# Patient Record
Sex: MALE | Race: WHITE | HISPANIC OR LATINO | Employment: FULL TIME | URBAN - METROPOLITAN AREA
[De-identification: names, ages, dates, MRNs, and addresses within clinical notes are randomized per-mention and may not be internally consistent; named-entity substitution may affect disease eponyms.]

---

## 2019-07-08 ENCOUNTER — APPOINTMENT (OUTPATIENT)
Dept: URGENT CARE | Facility: CLINIC | Age: 34
End: 2019-07-08
Payer: COMMERCIAL

## 2019-07-08 ENCOUNTER — OCCUPATIONAL MEDICINE (OUTPATIENT)
Dept: URGENT CARE | Facility: CLINIC | Age: 34
End: 2019-07-08
Payer: COMMERCIAL

## 2019-07-08 VITALS
WEIGHT: 233 LBS | BODY MASS INDEX: 39.78 KG/M2 | RESPIRATION RATE: 20 BRPM | OXYGEN SATURATION: 97 % | DIASTOLIC BLOOD PRESSURE: 84 MMHG | TEMPERATURE: 97.8 F | SYSTOLIC BLOOD PRESSURE: 128 MMHG | HEART RATE: 83 BPM | HEIGHT: 64 IN

## 2019-07-08 DIAGNOSIS — S61.209A AVULSION OF FINGER TIP, INITIAL ENCOUNTER: ICD-10-CM

## 2019-07-08 DIAGNOSIS — Z02.1 PRE-EMPLOYMENT DRUG SCREENING: ICD-10-CM

## 2019-07-08 LAB
AMP AMPHETAMINE: NORMAL
BREATH ALCOHOL COMMENT: NORMAL
COC COCAINE: NORMAL
INT CON NEG: NORMAL
INT CON POS: NORMAL
MET METHAMPHETAMINES: NORMAL
OPI OPIATES: NORMAL
PCP PHENCYCLIDINE: NORMAL
POC BREATHALIZER: 0 PERCENT (ref 0–0.01)
POC DRUG COMMENT 753798-OCCUPATIONAL HEALTH: NEGATIVE
THC: NORMAL

## 2019-07-08 PROCEDURE — 99203 OFFICE O/P NEW LOW 30 MIN: CPT | Mod: 25,29 | Performed by: PHYSICIAN ASSISTANT

## 2019-07-08 PROCEDURE — 90471 IMMUNIZATION ADMIN: CPT | Mod: 29 | Performed by: PHYSICIAN ASSISTANT

## 2019-07-08 PROCEDURE — 80305 DRUG TEST PRSMV DIR OPT OBS: CPT | Mod: 29 | Performed by: PHYSICIAN ASSISTANT

## 2019-07-08 PROCEDURE — 90715 TDAP VACCINE 7 YRS/> IM: CPT | Mod: 29 | Performed by: PHYSICIAN ASSISTANT

## 2019-07-08 PROCEDURE — 82075 ASSAY OF BREATH ETHANOL: CPT | Mod: 29 | Performed by: PHYSICIAN ASSISTANT

## 2019-07-08 ASSESSMENT — ENCOUNTER SYMPTOMS
BRUISES/BLEEDS EASILY: 0
CONSTITUTIONAL NEGATIVE: 1
ROS SKIN COMMENTS: SEE HPI
NEUROLOGICAL NEGATIVE: 1

## 2019-07-08 NOTE — LETTER
[facility] [address] [place] [place] [address]  Phone:  [phone] - Fax:  [phone]   [facility] Progress Report and Disability Certification  Date of Service: 7/8/2019   No Show:  No  Date / Time of Next Visit: 7/15/2019 @3:45PM   Claim Information   Patient Name: Andrew Kaiser  Claim Number:     Employer: [employer]  Date of Injury: 7/8/2019     Insurer / TPA: Workers Choice  ID / SSN:     Occupation: [employer]  Diagnosis: Diagnoses of Avulsion of finger tip, initial encounter and Pre-employment drug screening were pertinent to this visit.    Medical Information   Related to Industrial Injury? Yes    Subjective Complaints:  DOI: 07/08/19, 1230. Patient was cutting salmon skin and accidentally cut the tip of his left middle finger.   He notes it immediately started bleeding and he cleaned it off. He wrapped it with what was available and came to urgent care.  Patient notes it was painful but no numbness or tingling.  He is right handed.   Normal ROM of digit.   Is not UTD on tetanus.    Objective Findings: Left hand, middle finger:   Left middle digit with distal lateral aspect avulsion including the distal aspect of the nail.  There is mild active bleeding.   Normal sensation.  S/p digital block with plain lidocaine wound would explored, SC involvement only noted    Brisk cap refill. Normal ROM of digit.   Sealed with Dermabond.    Pre-Existing Condition(s):     Assessment:   Initial Visit    Status: Additional Care Required  Permanent Disability:No    Plan:      Diagnostics:      Comments:       Disability Information   Status: Released to Restricted Duty    From:  7/8/2019  Through: 7/15/2019 Restrictions are:     Physical Restrictions   Sitting:    Standing:    Stooping:    Bending:      Squatting:    Walking:    Climbing:    Pushing:      Pulling:    Other:    Reaching Above Shoulder (L):   Reaching Above Shoulder (R):       Reaching Below Shoulder  (L):    Reaching Below Shoulder (R):      Not to exceed Weight Limits   Carrying(hrs):   Weight Limit(lb):   Lifting(hrs):   Weight  Limit(lb):     Comments: Patient is released to restricted duty with regards to left hand, must keep finger covered and be allowed to air out digit on breaks   Tdap updated.   Wound care discussed, dermabond care.     1 week follow up with anticipated MMI discussed, sooner for any concerns or signs of infection.     Repetitive Actions   Hands: i.e. Fine Manipulations from Grasping: < or = to 1 hr/day  Comments:Left hand   Feet: i.e. Operating Foot Controls:     Driving / Operate Machinery:     Physician Name: Jm Mustafa P.A.-C. Physician Signature: JM Ennis P.A.-C. e-Signature: Dr. Salvatore Rodriguez, Medical Director   Clinic Name / Location: [address] [address] Clinic Phone Number: Dept: [phone]   Appointment Time: 2:15 Pm Visit Start Time: 2:40 PM   Check-In Time:  2:08 Pm Visit Discharge Time: 9:38 Pm    Original-Treating Physician or Chiropractor    Page 2-Insurer/TPA    Page 3-Employer    Page 4-Employee

## 2019-07-08 NOTE — PROGRESS NOTES
"Subjective:      Andrew Kaiser is a 33 y.o. male who presents with Finger Injury (NEW  DOI-7/8/19 (L) middle finger)      DOI: 07/08/19, 1230. Patient was cutting salmon skin and accidentally cut the tip of his left middle finger.   He notes it immediately started bleeding and he cleaned it off. He wrapped it with what was available and came to urgent care.  Patient notes it was painful but no numbness or tingling.  He is right handed.   Normal ROM of digit.   Is not UTD on tetanus.      HPI  As above.     Review of Systems   Constitutional: Negative.    Musculoskeletal:        SEE HPI   Skin:        SEE HPI   Neurological: Negative.    Endo/Heme/Allergies: Does not bruise/bleed easily.       PMH:  has no past medical history on file.  MEDS: No current outpatient prescriptions on file.  ALLERGIES: No Known Allergies  SURGHX: No past surgical history on file.  SOCHX:    FH: Family history was reviewed, no pertinent findings to report   Objective:     /84 (BP Location: Left arm, Patient Position: Sitting)   Pulse 83   Temp 36.6 °C (97.8 °F) (Temporal)   Resp 20   Ht 1.626 m (5' 4\")   Wt 105.7 kg (233 lb)   SpO2 97%   BMI 39.99 kg/m²      Physical Exam   Constitutional: He is oriented to person, place, and time. He appears well-developed and well-nourished. No distress.   Cardiovascular: Normal rate.    Pulmonary/Chest: Effort normal.   Neurological: He is alert and oriented to person, place, and time.   Skin: Skin is warm and dry.   Psychiatric: He has a normal mood and affect. His behavior is normal.   Vitals reviewed.    Left hand, middle finger:   Left middle digit with distal lateral aspect avulsion including the distal aspect of the nail.  There is mild active bleeding.   Normal sensation.  S/p digital block with plain lidocaine wound would explored, SC involvement only noted    Brisk cap refill. Normal ROM of digit.   Sealed with Dermabond.        Assessment/Plan:     1. Avulsion of finger tip, " initial encounter  Tdap =>8yo IM       Patient is released to full duty but must keep finger covered and be allowed to air out digit on breaks   Tdap updated.   Wound care discussed, dermabond care.     1 week follow up with anticipated MMI discussed, sooner for any concerns or signs of infection.       Itzel Mustafa P.A.-C.

## 2019-07-08 NOTE — LETTER
[facility] [address] [place] [place] [address]  Phone:  [phone] - Fax:  [phone]   [facility] Progress Report and Disability Certification  Date of Service: 7/8/2019   No Show:  No  Date / Time of Next Visit: 7/15/2019 @ 3:40pm   Claim Information   Patient Name: Andrew Kaiser  Claim Number:     Employer: [employer]  Date of Injury: 7/8/2019     Insurer / TPA: Workers [employer]  ID / SSN:     Occupation: [employer]  Diagnosis: Diagnoses of Avulsion of finger tip, initial encounter and Pre-employment drug screening were pertinent to this visit.    Medical Information   Related to Industrial Injury? Yes    Subjective Complaints:  DOI: 07/08/19, 1230. Patient was cutting salmon skin and accidentally cut the tip of his left middle finger.   He notes it immediately started bleeding and he cleaned it off. He wrapped it with what was available and came to urgent care.  Patient notes it was painful but no numbness or tingling.  He is right handed.   Normal ROM of digit.   Is not UTD on tetanus.    Objective Findings: Left hand, middle finger:   Left middle digit with distal lateral aspect avulsion including the distal aspect of the nail.  There is mild active bleeding.   Normal sensation.  S/p digital block with plain lidocaine wound would explored, SC involvement only noted    Brisk cap refill. Normal ROM of digit.   Sealed with Dermabond.    Pre-Existing Condition(s):     Assessment:   Initial Visit    Status: Additional Care Required  Permanent Disability:No    Plan:      Diagnostics:      Comments:       Disability Information   Status: Released to Full Duty    From:  7/8/2019  Through: 7/15/2019 Restrictions are:     Physical Restrictions   Sitting:    Standing:    Stooping:    Bending:      Squatting:    Walking:    Climbing:    Pushing:      Pulling:    Other:    Reaching Above Shoulder (L):   Reaching Above Shoulder (R):       Reaching Below Shoulder (L):   Reaching Below Shoulder (R):      Not to exceed Weight Limits   Carrying(hrs):   Weight Limit(lb):   Lifting(hrs):   Weight  Limit(lb):     Comments: Patient is released to full duty but must keep finger covered and be allowed to air out digit on breaks   Tdap updated.   Wound care discussed, dermabond care.     1 week follow up with anticipated MMI discussed, sooner for any concerns or signs of infection.     Repetitive Actions   Hands: i.e. Fine Manipulations from Grasping:     Feet: i.e. Operating Foot Controls:     Driving / Operate Machinery:     Physician Name: Jm Mustafa P.A.-C. Physician Signature: JM Ennis P.A.-C. e-Signature: Dr. Salvatore Rodriguez, Medical Director   Clinic Name / Location: [address] [address] Clinic Phone Number: Dept: [phone]   Appointment Time: 2:15 Pm Visit Start Time: 2:40 PM   Check-In Time:  2:08 Pm Visit Discharge Time:  4:32 PM   Original-Treating Physician or Chiropractor    Page 2-Insurer/TPA    Page 3-Employer    Page 4-Employee

## 2019-07-09 ENCOUNTER — APPOINTMENT (OUTPATIENT)
Dept: URGENT CARE | Facility: CLINIC | Age: 34
End: 2019-07-09
Payer: COMMERCIAL

## 2019-07-15 ENCOUNTER — OCCUPATIONAL MEDICINE (OUTPATIENT)
Dept: URGENT CARE | Facility: CLINIC | Age: 34
End: 2019-07-15
Payer: COMMERCIAL

## 2019-07-15 VITALS
TEMPERATURE: 98 F | HEIGHT: 64 IN | DIASTOLIC BLOOD PRESSURE: 82 MMHG | WEIGHT: 234 LBS | RESPIRATION RATE: 16 BRPM | OXYGEN SATURATION: 98 % | HEART RATE: 79 BPM | BODY MASS INDEX: 39.95 KG/M2 | SYSTOLIC BLOOD PRESSURE: 124 MMHG

## 2019-07-15 DIAGNOSIS — S61.209D AVULSION OF FINGER TIP, SUBSEQUENT ENCOUNTER: ICD-10-CM

## 2019-07-15 PROCEDURE — 99213 OFFICE O/P EST LOW 20 MIN: CPT | Mod: 29 | Performed by: PHYSICIAN ASSISTANT

## 2019-07-15 ASSESSMENT — ENCOUNTER SYMPTOMS
CONSTITUTIONAL NEGATIVE: 1
NEUROLOGICAL NEGATIVE: 1

## 2019-07-15 NOTE — LETTER
"   [facility] [place]  [address] [place] [place] [address]  Phone:  [phone] - Fax:  [phone]   [facility] Progress Report and Disability Certification  Date of Service: 7/15/2019   No Show:  No  Date / Time of Next Visit:  MMI   Claim Information   Patient Name: Andrew Kaiser  Claim Number:     Employer: [employer]  Date of Injury: 7/8/2019     Insurer / TPA: Workers [employer]  ID / SSN:     Occupation: [employer]  Diagnosis: The encounter diagnosis was Avulsion of finger tip, subsequent encounter.    Medical Information   Related to Industrial Injury? Yes    Subjective Complaints:  DOI: 07/08/19, 1230. \"Patient was cutting salmon skin and accidentally cut the tip of his left middle finger.\"  Patient returns today to report it has been healing well.  Slight sensitivity but no further bleeding or irritation.   He feels he is ready to go back to work.     Objective Findings: Vitals reviewed.   Left hand, middle finger:   Left middle digit with distal lateral aspect WELL HEALING avulsion including the distal aspect of the nail.   Eschar formation, clean dry, no erythema or swelling.     Pre-Existing Condition(s):     Assessment:   Condition Improved    Status: Discharged /  MMI  Permanent Disability:No    Plan:      Diagnostics:      Comments:       Disability Information   Status: Released to Full Duty    From:     Through:   Restrictions are:     Physical Restrictions   Sitting:    Standing:    Stooping:    Bending:      Squatting:    Walking:    Climbing:    Pushing:      Pulling:    Other:    Reaching Above Shoulder (L):   Reaching Above Shoulder (R):       Reaching Below Shoulder (L):    Reaching Below Shoulder (R):      Not to exceed Weight Limits   Carrying(hrs):   Weight Limit(lb):   Lifting(hrs):   Weight  Limit(lb):     Comments: Well healing avulsion laceration.   Wound sealed, dry and without signs of infection   He is cleared to return to full duty on his next " shift starting tomorrow.  He will continue wear gloves as required at work.     Repetitive Actions   Hands: i.e. Fine Manipulations from Grasping:     Feet: i.e. Operating Foot Controls:     Driving / Operate Machinery:     Physician Name: Jm Mustafa P.A.-C. Physician Signature: JM Ennis P.A.-C. e-Signature: Dr. Salvatore Rodriguez, Medical Director   Clinic Name / Location: [address] [address] Clinic Phone Number: Dept: [phone]   Appointment Time: 3:45 Pm Visit Start Time: 3:43 PM   Check-In Time:  3:34 Pm Visit Discharge Time:  3:51 PM   Original-Treating Physician or Chiropractor    Page 2-Insurer/TPA    Page 3-Employer    Page 4-Employee

## 2019-07-15 NOTE — PROGRESS NOTES
"Subjective:      Andrew Kaiser is a 33 y.o. male who presents with Finger Injury (workers comp finger injury )      DOI: 07/08/19, 1230. \"Patient was cutting salmon skin and accidentally cut the tip of his left middle finger.\"  Patient returns today to report it has been healing well.  Slight sensitivity but no further bleeding or irritation.   He feels he is ready to go back to work.       HPI   As above.     Review of Systems   Constitutional: Negative.    Musculoskeletal:        SEE HPI   Neurological: Negative.    Endo/Heme/Allergies: Negative.        PMH:  has no past medical history on file.  MEDS: No current outpatient prescriptions on file.  ALLERGIES: No Known Allergies  SURGHX: No past surgical history on file.  SOCHX:  does not have a smoking history on file. He has never used smokeless tobacco.  FH: Family history was reviewed, no pertinent findings to report   Objective:     /82 (BP Location: Left arm, Patient Position: Sitting, BP Cuff Size: Adult)   Pulse 79   Temp 36.7 °C (98 °F) (Temporal)   Resp 16   Ht 1.626 m (5' 4\")   Wt 106.1 kg (234 lb)   SpO2 98%   BMI 40.17 kg/m²      Physical Exam   Constitutional: He is oriented to person, place, and time. He appears well-developed and well-nourished. No distress.   Cardiovascular: Normal rate.    Pulmonary/Chest: Effort normal.   Neurological: He is alert and oriented to person, place, and time.   Skin: Skin is warm and dry.   Psychiatric: He has a normal mood and affect. His behavior is normal.     Vitals reviewed.   Left hand, middle finger:   Left middle digit with distal lateral aspect WELL HEALING avulsion including the distal aspect of the nail.   Eschar formation, clean dry, no erythema or swelling.         Assessment/Plan:     1. Avulsion of finger tip, subsequent encounter         -well healing laceration   -MMI      Itzel Mustafa P.A.-C.      "

## 2019-11-24 ENCOUNTER — APPOINTMENT (OUTPATIENT)
Dept: RADIOLOGY | Facility: MEDICAL CENTER | Age: 34
End: 2019-11-24
Attending: EMERGENCY MEDICINE
Payer: COMMERCIAL

## 2019-11-24 ENCOUNTER — HOSPITAL ENCOUNTER (EMERGENCY)
Facility: MEDICAL CENTER | Age: 34
End: 2019-11-24
Attending: EMERGENCY MEDICINE
Payer: COMMERCIAL

## 2019-11-24 VITALS
HEART RATE: 91 BPM | OXYGEN SATURATION: 94 % | TEMPERATURE: 97.9 F | WEIGHT: 225.97 LBS | DIASTOLIC BLOOD PRESSURE: 68 MMHG | HEIGHT: 65 IN | SYSTOLIC BLOOD PRESSURE: 130 MMHG | RESPIRATION RATE: 18 BRPM | BODY MASS INDEX: 37.65 KG/M2

## 2019-11-24 DIAGNOSIS — B34.9 VIRAL SYNDROME: ICD-10-CM

## 2019-11-24 LAB
FLUAV RNA SPEC QL NAA+PROBE: NEGATIVE
FLUBV RNA SPEC QL NAA+PROBE: POSITIVE

## 2019-11-24 PROCEDURE — 87502 INFLUENZA DNA AMP PROBE: CPT

## 2019-11-24 PROCEDURE — 71046 X-RAY EXAM CHEST 2 VIEWS: CPT

## 2019-11-24 PROCEDURE — 700102 HCHG RX REV CODE 250 W/ 637 OVERRIDE(OP): Performed by: EMERGENCY MEDICINE

## 2019-11-24 PROCEDURE — A9270 NON-COVERED ITEM OR SERVICE: HCPCS | Performed by: EMERGENCY MEDICINE

## 2019-11-24 PROCEDURE — 99284 EMERGENCY DEPT VISIT MOD MDM: CPT

## 2019-11-24 RX ORDER — BENZONATATE 100 MG/1
100 CAPSULE ORAL 3 TIMES DAILY PRN
Qty: 60 CAP | Refills: 0 | Status: SHIPPED | OUTPATIENT
Start: 2019-11-24

## 2019-11-24 RX ORDER — BENZONATATE 100 MG/1
100 CAPSULE ORAL ONCE
Status: COMPLETED | OUTPATIENT
Start: 2019-11-24 | End: 2019-11-24

## 2019-11-24 RX ORDER — ACETAMINOPHEN 325 MG/1
650 TABLET ORAL ONCE
Status: COMPLETED | OUTPATIENT
Start: 2019-11-24 | End: 2019-11-24

## 2019-11-24 RX ADMIN — BENZONATATE 100 MG: 100 CAPSULE ORAL at 03:25

## 2019-11-24 RX ADMIN — ACETAMINOPHEN 650 MG: 325 TABLET, FILM COATED ORAL at 03:25

## 2019-11-24 SDOH — HEALTH STABILITY: MENTAL HEALTH: HOW OFTEN DO YOU HAVE A DRINK CONTAINING ALCOHOL?: NEVER

## 2019-11-24 NOTE — ED PROVIDER NOTES
ED Provider Note    Scribed for Cholo Longoria M.D. by Jossy Pond. 11/24/2019, 2:07 AM.    Primary care provider: None  Means of arrival: Walk in  History obtained from: Patient  History limited by: None    CHIEF COMPLAINT  Chief Complaint   Patient presents with   • Flu Like Symptoms     Pt states symptoms started on Wednesday with fever, chills, fatigue and nausea, with a dry nonproductive dry cough, has not received flu shot and states he has been around people who are sick   • Sore Throat     8/10 sharp sore throat, denies blood in sputum     HPI  Andrew Kaiser is a 34 y.o. male who presents to the Emergency Department for evaluation of flu like symptoms. For the past 5 days, the patient has had a subjective fever, generalized weakness, chills, nausea, nausea, cough, muscle aches, headaches, myalgia, epigastric pain, and back pain. The patient has been having watery diarrhea for the past two days. The patient states that he has been coughing constantly for the past 2 days which has caused him to have a sore throat. The pain associated with the sore throat prompted him to come to the ED today.  He reports taking Robitussin, Theraflu, and Motrin for his symptoms to no alleviation. The patient denies any associated rhinorrhea, vomiting, chest pain, dysuria, shortness of breath, or post-tussive emesis. The patient reports having sick contact. The patient reports returning from [place] on 11/8/19 at which time he was not sick.     The patient denies a history of smoking, alcohol, or drug use. He denies having any chronic medical problems, taking daily meds, or having any medication allergies.     REVIEW OF SYSTEMS  See HPI for further details. All other systems are negative.     PAST MEDICAL HISTORY   None noted    SURGICAL HISTORY  patient denies any surgical history    SOCIAL HISTORY  Social History     Tobacco Use   • Smoking status: Never Smoker   • Smokeless tobacco: Never Used   Substance Use Topics  "  • Alcohol use: Never     Frequency: Never   • Drug use: Never      Social History     Substance and Sexual Activity   Drug Use Never     FAMILY HISTORY  History reviewed. No pertinent family history.    CURRENT MEDICATIONS  Reviewed.  See Encounter Summary.     ALLERGIES  No Known Allergies    PHYSICAL EXAM  VITAL SIGNS: /77   Pulse (!) 101   Temp 36.6 °C (97.9 °F)   Resp 18   Ht 1.651 m (5' 5\")   Wt 102.5 kg (225 lb 15.5 oz)   SpO2 98%   BMI 37.60 kg/m²   Constitutional: Alert in no apparent distress. Frequently coughing on exam.  HENT: No signs of trauma, Bilateral external ears normal, Nose normal.   Eyes: Pupils are equal and reactive, Conjunctiva normal, Non-icteric.   Neck: Normal range of motion, No tenderness, Supple, No stridor.   Cardiovascular: Regular rate and rhythm, no murmurs.   Thorax & Lungs: Normal breath sounds, No respiratory distress, No wheezing, No chest tenderness. Dry cough.   Abdomen: Bowel sounds normal, Soft, No tenderness, No masses, No pulsatile masses. No peritoneal signs.  Skin: Warm, Dry, No erythema, No rash.   Back: No bony tenderness, No CVA tenderness.   Extremities: Intact distal pulses, No edema, No tenderness, No cyanosis  Musculoskeletal: Good range of motion in all major joints. No tenderness to palpation or major deformities noted.   Neurologic: Alert , Normal motor function, Normal sensory function, No focal deficits noted.   Psychiatric: Affect normal, Judgment normal, Mood normal.     DIAGNOSTIC STUDIES / PROCEDURES     LABS  Results for orders placed or performed during the hospital encounter of 11/24/19   Influenza A/B By PCR (Adult - Flu Only)   Result Value Ref Range    Influenza virus A RNA Negative Negative    Influenza virus B, PCR POSITIVE (A) Negative     All labs were reviewed by me.    RADIOLOGY  DX-CHEST-2 VIEWS   Final Result      No acute cardiopulmonary abnormality.        The radiologist's interpretation of all radiological studies and " images have been reviewed by me.    COURSE & MEDICAL DECISION MAKING  Pertinent Labs & Imaging studies reviewed. (See chart for details)    2:07 AM - Patient seen and examined at bedside. Ordered Influenza A/B, Group A strep by PCR, DX-Chest to evaluate his symptoms. I explained the plan of care to the patient and he is in agreement.     Decision Making:  This is a 34 y.o. year old male who presents with above complaint.  Strong suspicion for viral syndrome and possibly even influenza.  Patient has however outside the window for treatment.  I will at this point provide him with Tessalon instructions use additional Tylenol, Motrin, hydration and honey as well as Elderberry extract for additional symptomatic control.  Influenza A positive.  He is understanding return precautions outpatient follow-up at clinic as being referred today within the given timeframe.    The patient will return for new or worsening symptoms and is stable at the time of discharge.    The patient is referred to a primary physician for blood pressure management, diabetic screening, and for all other preventative health concerns.    DISPOSITION:  Patient will be discharged home in stable condition.    FOLLOW UP:  [address] [address]  Schedule an appointment as soon as possible for a visit in 2 days      [facility], Emergency Dept  [address] [facility] [address] [phone]    If symptoms worsen      OUTPATIENT MEDICATIONS:  Discharge Medication List as of 11/24/2019  5:09 AM      START taking these medications    Details   benzonatate (TESSALON) 100 MG Cap Take 1 Cap by mouth 3 times a day as needed for Cough., Disp-60 Cap, R-0, Print Rx Paper             FINAL IMPRESSION  1. Viral syndrome     2.  Influenza a     I, Jossy Pond (Maday), am scribing for, and in the presence of, Cholo Longoria M.D..    Electronically signed by: Jossy Wong),  11/24/2019    ICholo M.D. personally performed the services described in this documentation, as scribed by Jossy Pond in my presence, and it is both accurate and complete.     C.    The note accurately reflects work and decisions made by me.  Cholo Longoria  11/24/2019  5:00 AM

## 2019-11-24 NOTE — ED TRIAGE NOTES
"Chief Complaint   Patient presents with   • Flu Like Symptoms     Pt states symptoms started on Wednesday with fever, chills, fatigue and nausea, with a dry nonproductive dry cough, has not received flu shot and states he has been around people who are sick   • Sore Throat     8/10 sharp sore throat, denies blood in sputum     Denies diarrhea or vomiting.     Pt to triage for above complaint. Educated on triage process, encourage to inform staff of any changes.     /77   Pulse (!) 101   Temp 36.6 °C (97.9 °F)   Resp 18   Ht 1.651 m (5' 5\")   Wt 102.5 kg (225 lb 15.5 oz)   SpO2 98%   BMI 37.60 kg/m²     "

## 2020-05-20 ENCOUNTER — HOSPITAL ENCOUNTER (OUTPATIENT)
Facility: MEDICAL CENTER | Age: 35
End: 2020-05-20
Payer: COMMERCIAL

## 2020-05-23 LAB
SARS-COV-2 RNA SPEC QL NAA+PROBE: NOT DETECTED
SPECIMEN SOURCE: NORMAL

## 2021-10-11 ENCOUNTER — APPOINTMENT (OUTPATIENT)
Dept: URGENT CARE | Facility: CLINIC | Age: 36
End: 2021-10-11
Payer: COMMERCIAL